# Patient Record
Sex: MALE | Race: WHITE
[De-identification: names, ages, dates, MRNs, and addresses within clinical notes are randomized per-mention and may not be internally consistent; named-entity substitution may affect disease eponyms.]

---

## 2019-09-16 ENCOUNTER — HOSPITAL ENCOUNTER (EMERGENCY)
Dept: HOSPITAL 63 - ER | Age: 49
Discharge: ANOTHER HOSPITAL | End: 2019-09-16
Payer: COMMERCIAL

## 2019-09-16 VITALS
SYSTOLIC BLOOD PRESSURE: 135 MMHG | SYSTOLIC BLOOD PRESSURE: 135 MMHG | SYSTOLIC BLOOD PRESSURE: 135 MMHG | DIASTOLIC BLOOD PRESSURE: 82 MMHG | DIASTOLIC BLOOD PRESSURE: 82 MMHG | DIASTOLIC BLOOD PRESSURE: 82 MMHG

## 2019-09-16 VITALS — WEIGHT: 232.15 LBS | BODY MASS INDEX: 33.23 KG/M2 | HEIGHT: 70 IN

## 2019-09-16 DIAGNOSIS — I63.00: Primary | ICD-10-CM

## 2019-09-16 DIAGNOSIS — R47.81: ICD-10-CM

## 2019-09-16 LAB
ALBUMIN SERPL-MCNC: 4.5 G/DL (ref 3.4–5)
ALBUMIN/GLOB SERPL: 1.6 {RATIO} (ref 1–1.7)
ALP SERPL-CCNC: 71 U/L (ref 46–116)
ALT SERPL-CCNC: 39 U/L (ref 16–63)
ANION GAP SERPL CALC-SCNC: 11 MMOL/L (ref 6–14)
AST SERPL-CCNC: 23 U/L (ref 15–37)
BASOPHILS # BLD AUTO: 0.1 X10^3/UL (ref 0–0.2)
BASOPHILS NFR BLD: 1 % (ref 0–3)
BILIRUB SERPL-MCNC: 0.3 MG/DL (ref 0.2–1)
BUN/CREAT SERPL: 18 (ref 6–20)
CA-I SERPL ISE-MCNC: 25 MG/DL (ref 8–26)
CALCIUM SERPL-MCNC: 9 MG/DL (ref 8.5–10.1)
CHLORIDE SERPL-SCNC: 103 MMOL/L (ref 98–107)
CO2 SERPL-SCNC: 25 MMOL/L (ref 21–32)
CREAT SERPL-MCNC: 1.4 MG/DL (ref 0.7–1.3)
EOSINOPHIL NFR BLD: 0.1 X10^3/UL (ref 0–0.7)
EOSINOPHIL NFR BLD: 1 % (ref 0–3)
ERYTHROCYTE [DISTWIDTH] IN BLOOD BY AUTOMATED COUNT: 14.1 % (ref 11.5–14.5)
ERYTHROCYTE [DISTWIDTH] IN BLOOD BY AUTOMATED COUNT: 14.5 % (ref 11.5–14.5)
GFR SERPLBLD BASED ON 1.73 SQ M-ARVRAT: 54.1 ML/MIN
GLOBULIN SER-MCNC: 2.8 G/DL (ref 2.2–3.8)
GLUCOSE SERPL-MCNC: 167 MG/DL (ref 70–99)
HCT VFR BLD CALC: 44.3 % (ref 39–53)
HCT VFR BLD CALC: 44.7 % (ref 39–53)
HGB BLD-MCNC: 15 G/DL (ref 13–17.5)
HGB BLD-MCNC: 15.2 G/DL (ref 13–17.5)
LYMPHOCYTES # BLD: 3.3 X10^3/UL (ref 1–4.8)
LYMPHOCYTES NFR BLD AUTO: 52 % (ref 24–48)
MCH RBC QN AUTO: 31 PG (ref 25–35)
MCH RBC QN AUTO: 31 PG (ref 25–35)
MCHC RBC AUTO-ENTMCNC: 34 G/DL (ref 31–37)
MCHC RBC AUTO-ENTMCNC: 34 G/DL (ref 31–37)
MCV RBC AUTO: 91 FL (ref 79–100)
MCV RBC AUTO: 91 FL (ref 79–100)
MONO #: 0.5 X10^3/UL (ref 0–1.1)
MONOCYTES NFR BLD: 9 % (ref 0–9)
NEUT #: 2.4 X10^3UL (ref 1.8–7.7)
NEUTROPHILS NFR BLD AUTO: 38 % (ref 31–73)
PLATELET # BLD AUTO: 235 X10^3/UL (ref 140–400)
PLATELET # BLD AUTO: 243 X10^3/UL (ref 140–400)
POTASSIUM SERPL-SCNC: 3.9 MMOL/L (ref 3.5–5.1)
PROT SERPL-MCNC: 7.3 G/DL (ref 6.4–8.2)
RBC # BLD AUTO: 4.86 X10^6/UL (ref 4.3–5.7)
RBC # BLD AUTO: 4.93 X10^6/UL (ref 4.3–5.7)
SODIUM SERPL-SCNC: 139 MMOL/L (ref 136–145)
WBC # BLD AUTO: 6.2 X10^3/UL (ref 4–11)
WBC # BLD AUTO: 6.3 X10^3/UL (ref 4–11)

## 2019-09-16 PROCEDURE — 37195 THROMBOLYTIC THERAPY STROKE: CPT

## 2019-09-16 PROCEDURE — 36415 COLL VENOUS BLD VENIPUNCTURE: CPT

## 2019-09-16 PROCEDURE — 70450 CT HEAD/BRAIN W/O DYE: CPT

## 2019-09-16 PROCEDURE — 85610 PROTHROMBIN TIME: CPT

## 2019-09-16 PROCEDURE — 93005 ELECTROCARDIOGRAM TRACING: CPT

## 2019-09-16 PROCEDURE — 70498 CT ANGIOGRAPHY NECK: CPT

## 2019-09-16 PROCEDURE — 71045 X-RAY EXAM CHEST 1 VIEW: CPT

## 2019-09-16 PROCEDURE — 85730 THROMBOPLASTIN TIME PARTIAL: CPT

## 2019-09-16 PROCEDURE — 80053 COMPREHEN METABOLIC PANEL: CPT

## 2019-09-16 PROCEDURE — 85027 COMPLETE CBC AUTOMATED: CPT

## 2019-09-16 PROCEDURE — 85025 COMPLETE CBC W/AUTO DIFF WBC: CPT

## 2019-09-16 PROCEDURE — 70496 CT ANGIOGRAPHY HEAD: CPT

## 2019-09-16 PROCEDURE — 84484 ASSAY OF TROPONIN QUANT: CPT

## 2019-09-16 PROCEDURE — 99291 CRITICAL CARE FIRST HOUR: CPT

## 2019-09-16 NOTE — EKG
[facility] [address], [place] [address]

Test Date:    2019               Test Time:    06:54:19

Pat Name:     RANI HOGAN             Department:   

Patient ID:   [id number]           Room:          

Gender:       M                        Technician:   BRENDAN

:          1970               Requested By: BENTIO HOPPER

Order Number: [id number]            Reading MD:     

                                 Measurements

Intervals                              Axis          

Rate:         83                       P:            38

RI:           166                      QRS:          23

QRSD:         90                       T:            27

QT:           372                                    

QTc:          438                                    

                           Interpretive Statements

SINUS RHYTHM

NORMAL ECG

RI6.01

No previous ECG available for comparison

## 2019-09-16 NOTE — RAD
CT CODE STROKE HEAD WO

 

History: Slurred speech

 

Comparison: November 7, 2013 MRI brain exam and July 16, 2010 CT head exam

 

Technique: Noncontrast CT imaging was performed of the head.  Exposure: 

One or more of the following individualized dose reduction techniques were

utilized for this examination:  1. Automated exposure control  2. 

Adjustment of the mA and/or kV according to patient size  3. Use of 

iterative reconstruction technique.

 

Findings: No acute intracranial hyperdense hemorrhage is identified. There

is no midline shift or intra-axial mass effect. Ventricular size is within

normal limits. There is now focus of low density of the posterior right 

cerebellum about 1.1 cm in size. There is questionable subtle focus of 

lower density of the left ez, difficult to evaluate given artifact in 

this region.

 

Impression:

1. There is no evidence of acute intracranial hemorrhage.

2. There is focus of lower density of the posterior right cerebellum new 

since the previous exams. Features are more suggestive of chronic infarct 

although could be due to late subacute infarct. There is a questionable 

subtle focus of lower density of the left ez which could be due to site 

of more recent ischemia although otherwise difficult to evaluate, better 

characterized by MRI if clinically needed.

 

Findings discussed with BENITO HOPPER at 9/16/2019 7:02 AM.

 

**********FOR INTERNAL CODING PURPOSES**********

 

 

RESULT CODE: (C)  

 

 

 

 

 

 

Electronically signed by: Peter Marks MD (9/16/2019 7:04 AM) [facility]

## 2019-09-16 NOTE — RAD
CTA head and neck

 

History: Slurred speech, right hand weakness, stroke

 

Technique: After bolus of intravenous contrast, volumetric CT data 

acquisition was acquired of the head and neck. Multiplanar reconstruction 

images to include MIP and 3-D reconstruction images are submitted.  

Exposure: One or more of the following individualized dose reduction 

techniques were utilized for this examination:  1. Automated exposure 

control  2. Adjustment of the mA and/or kV according to patient size  3. 

Use of iterative reconstruction technique.

 

Comparison: Head CT the same day

 

Any determination of stenosis is based on NASCET criteria.  

 

CTA head: 

Findings: There is mild motion. There is some venous contamination. Both 

vertebral arteries constitute the basilar artery, slightly dominant left 

vertebral artery. There is visualization of segments of bilateral PICAs, 

left AICA, bilateral superior cerebellar arteries. Right AICA is not 

well-visualized. There is patent left posterior communicating artery, 

hypoplastic left A1 segment is not well-visualized more distally. No 

significant right posterior communicating artery is visualized. Anterior 

communicating artery is not clearly identified. There is visualization of 

segments of the anterior, middle, posterior cerebral arteries bilaterally.

No focal filling defect is identified of the larger intracranial vessels. 

No significant intracranial aneurysm is identified.

 

There is small left maxillary sinus mucous retention cyst.

 

Impression:

1.  No focal filling defect is identified of the larger intracranial 

vessels. Right AICA is not well-visualized on this exam although may be 

small in caliber.

 

Neck CTA:

 

Findings: There is some motion degradation. Visualized distal ascending 

thoracic aorta is ectatic about 3.9 cm, not fully evaluated. There is a 

shared origin of the brachiocephalic and left common carotid arteries. No 

significant stenosis or dissection flap is identified of the cervical 

arterial vasculature. There is visualization of the cervical vertebral 

arteries bilaterally.

 

Impression:

1. Exam is somewhat degraded by motion. There is no convincing dissection 

flap or stenosis of the cervical arterial vasculature.

2. There is ectatic distal ascending thoracic aorta about 3.9 cm, thoracic

aorta not fully evaluated.

 

Findings discussed with BENITO HOPPER at 9/16/2019 7:46 AM.

 

**********FOR INTERNAL CODING PURPOSES**********

 

 

RESULT CODE: (C)  

 

 

 

 

 

 

 

 

Electronically signed by: Peter Marks MD (9/16/2019 7:46 AM) [facility]

## 2019-09-16 NOTE — RAD
CHEST AP ONLY

 

History: Stroke

 

Comparison: None.

 

Findings:

Single view of the chest is submitted. 

There is no dependent pleural fluid or pneumothorax. Heart size is within 

normal limits. There is mild perihilar opacity.

 

Impression: 

 

1.  No lobar infiltrate. There is mild perihilar opacity possible mild 

central vascular congestion.

 

Electronically signed by: Peter Marks MD (9/16/2019 7:38 AM) [facility]

## 2019-09-16 NOTE — PHYS DOC
Adult General


Chief Complaint


Chief Complaint:  NEURO SYMPTOMS/DEFICITS





HPI


HPI


49-year-old male presents via EMS for  Code Stroke. Patient's last known well 

was last night around 9:30 PM. He woke up around 4:30 AM and took a shower and 

got ready for work. He kissed his wife girma around 5 AM. At that time he was 

talking without difficulty. He went downstairs and within several minutes came 

back up stairs and turned on the bedroom light. The patient's spouse asked him 

what was going on and he was unable to speak to her. She tells me that he looked

pale and concerned. He was making a repetitive cervical motion with his hand. 

She asked him if he wanted her to call 911 and he nodded yes. The patient has 

been unable to speak since that time. He was unable to answer questions for EMS.

He is following directions. He was found to have right lower facial asymmetry 

and decreased hand strength on the right. On arrival to the ED, the patient was 

still unable to speak. He was alert and following commands.





Review of Systems


Review of Systems





Constitutional: Denies fever or chills []


Eyes: Denies change in visual acuity, redness, or eye pain []


HENT: Denies nasal congestion or sore throat []


Respiratory: Denies cough or shortness of breath []


Cardiovascular: No additional information not addressed in HPI []


GI: Denies abdominal pain, nausea, vomiting, bloody stools or diarrhea []


: Denies dysuria or hematuria []


Musculoskeletal: Denies back pain or joint pain []


Integument: Denies rash or skin lesions []


Neurologic: Denies headache, focal weakness or sensory changes []


Endocrine: Denies polyuria or polydipsia []





All other systems were reviewed and found to be within normal limits, except as 

documented in this note.





Physical Exam


Physical Exam





Constitutional: Well developed, well nourished, no acute distress, non-toxic 

appearance. []


HENT: Normocephalic, atraumatic, bilateral external ears normal, oropharynx 

moist, no oral exudates, nose normal. []


Eyes: PERRLA, EOMI, conjunctiva normal, no discharge. [] 


Neck: Normal range of motion, no tenderness, supple, no stridor. [] 


Cardiovascular:Heart rate regular rhythm, no murmur []


Lungs & Thorax:  Bilateral breath sounds clear to auscultation []


Abdomen: Bowel sounds normal, soft, no tenderness, no masses, no pulsatile 

masses. [] 


Skin: Warm, dry, no erythema, no rash. [] 


Back: No tenderness, no CVA tenderness. [] 


Extremities: No tenderness, no cyanosis, no clubbing, ROM intact, no edema. [] 


Neurologic: Alert and oriented X 3, normal motor function, normal sensory 

function, no focal deficits noted. []


Psychologic: Affect normal, judgement normal, mood normal. []





EKG


EKG


Sinus rhythm, rate 83, normal axis, no ST elevations or depressions.[]





Radiology/Procedures


Radiology/Procedures


[]


Impressions:


CT CODE STROKE HEAD WO


 


History: Slurred speech


 


Comparison: November 7, 2013 MRI brain exam and July 16, 2010 CT head exam


 


Technique: Noncontrast CT imaging was performed of the head.  Exposure: 


One or more of the following individualized dose reduction techniques were


utilized for this examination:  1. Automated exposure control  2. 


Adjustment of the mA and/or kV according to patient size  3. Use of 


iterative reconstruction technique.


 


Findings: No acute intracranial hyperdense hemorrhage is identified. There


is no midline shift or intra-axial mass effect. Ventricular size is within


normal limits. There is now focus of low density of the posterior right 


cerebellum about 1.1 cm in size. There is questionable subtle focus of 


lower density of the left ez, difficult to evaluate given artifact in 


this region.


 


Impression:


1. There is no evidence of acute intracranial hemorrhage.


2. There is focus of lower density of the posterior right cerebellum new 


since the previous exams. Features are more suggestive of chronic infarct 


although could be due to late subacute infarct. There is a questionable 


subtle focus of lower density of the left ez which could be due to site 


of more recent ischemia although otherwise difficult to evaluate, better 


characterized by MRI if clinically needed.


 


Findings discussed with BENITO HOPPER at 9/16/2019 7:02 AM.


 


**********FOR INTERNAL CODING PURPOSES**********


 


 


RESULT CODE: (C)  


 


 


 


 


 


 


Electronically signed by: Cecilia Haynes MD (9/16/2019 7:04 AM) [facility]














DICTATED AND SIGNED BY:     CECILIA HAYNES MD


DATE:     09/16/19 0704





CC: BENITO HOPPER DO; EBER WELDON MD ~











CHEST AP ONLY


 


History: Stroke


 


Comparison: None.


 


Findings:


Single view of the chest is submitted. 


There is no dependent pleural fluid or pneumothorax. Heart size is within 


normal limits. There is mild perihilar opacity.


 


Impression: 


 


1.  No lobar infiltrate. There is mild perihilar opacity possible mild 


central vascular congestion.


 


Electronically signed by: Cecilia Haynes MD (9/16/2019 7:38 AM) [facility]














DICTATED AND SIGNED BY:     CECILIA HAYNES MD


DATE:     09/16/19 0738





CC: BENITO HOPPER DO; EBER WELDON MD ~











CTA head and neck


 


History: Slurred speech, right hand weakness, stroke


 


Technique: After bolus of intravenous contrast, volumetric CT data 


acquisition was acquired of the head and neck. Multiplanar reconstruction 


images to include MIP and 3-D reconstruction images are submitted.  


Exposure: One or more of the following individualized dose reduction 


techniques were utilized for this examination:  1. Automated exposure 


control  2. Adjustment of the mA and/or kV according to patient size  3. 


Use of iterative reconstruction technique.


 


Comparison: Head CT the same day


 


Any determination of stenosis is based on NASCET criteria.  


 


CTA head: 


Findings: There is mild motion. There is some venous contamination. Both 


vertebral arteries constitute the basilar artery, slightly dominant left 


vertebral artery. There is visualization of segments of bilateral PICAs, 


left AICA, bilateral superior cerebellar arteries. Right AICA is not 


well-visualized. There is patent left posterior communicating artery, 


hypoplastic left A1 segment is not well-visualized more distally. No 


significant right posterior communicating artery is visualized. Anterior 


communicating artery is not clearly identified. There is visualization of 


segments of the anterior, middle, posterior cerebral arteries bilaterally.


No focal filling defect is identified of the larger intracranial vessels. 


No significant intracranial aneurysm is identified.


 


There is small left maxillary sinus mucous retention cyst.


 


Impression:


1.  No focal filling defect is identified of the larger intracranial 


vessels. Right AICA is not well-visualized on this exam although may be 


small in caliber.


 


Neck CTA:


 


Findings: There is some motion degradation. Visualized distal ascending 


thoracic aorta is ectatic about 3.9 cm, not fully evaluated. There is a 


shared origin of the brachiocephalic and left common carotid arteries. No 


significant stenosis or dissection flap is identified of the cervical 


arterial vasculature. There is visualization of the cervical vertebral 


arteries bilaterally.


 


Impression:


1. Exam is somewhat degraded by motion. There is no convincing dissection 


flap or stenosis of the cervical arterial vasculature.


2. There is ectatic distal ascending thoracic aorta about 3.9 cm, thoracic


aorta not fully evaluated.


 


Findings discussed with BENITO HOPPER at 9/16/2019 7:46 AM.


 


**********FOR INTERNAL CODING PURPOSES**********


 


 


RESULT CODE: (C)  


 


 


 


 


 


 


 


 


Electronically signed by: Cecilia Hyanes MD (9/16/2019 7:46 AM) [facility]














DICTATED AND SIGNED BY:     CECILIA HAYNES MD


DATE:     09/16/19 0746





CC: BENITO OHPPER DO; EBER WELDON MD ~





Course & Med Decision Making


Course & Med Decision Making


Pertinent Labs and Imaging studies reviewed. (See chart for details)


Further discussion with the patient's spouse reveals that he appeared to be 

normal up to 5 AM rather than last night. She states that he was able to get 

ready to go to work completely on his own. He was speaking at that time. I will 

consider his last known well to be 5 AM. This places the patient within the 

window for TPA. His head CT is negative. His initial NIH scale is 8 with 

significant aphasia. The patient has no history of hemorrhagic stroke. He has no

 history of bleeding disorders. No recent surgery. He is not on any blood 

thinners or antiplatelets. I believe the patient has a good candidate for TPA. 

At 7:20 AM the patient was ordered to be given alteplase.


The patient's labs are unremarkable except for slightly elevated creatinine of 

1.4. I have no previous for comparison. His troponin is negative. His EKG is 

unremarkable. The patient's blood pressure has remained within normal limits.


Prior to the alteplase, the patient was able to speak. He can answer simple 

questions with yes, no, and couple of other short words.  He appeared to be able

 to understand everything he was asked. He was just unable to get the words out.

 This would improve his NIH scale to 6 as he does not appear to have dysarthria.

 After the alteplase, the patient did not make further significant improvement. 

 I spoke with Dr. Acosta and he has accepted the patient for transfer. And 

admission to the ICU at [facility]. The patient and his family 

are in agreement with this plan. He will go by ambulance.





43 minutes of critical care time was spent on this patient exclusive of other 

billable procedures.


[]





Dragon Disclaimer


Dragon Disclaimer


This electronic medical record was generated, in whole or in part, using a voice

 recognition dictation system.


NIH Stroke Scale:  








NIH Stroke Scale Response (Comments) Value


 


Level of Consciousness:                 0 Alert/Responsive 0


 


LOC Questions:                          2 Answers neither correct 2


 


LOC Commands:                           0 Performs both tasks 0


 


Best Gaze:                              0 Normal 0


 


Visual:                                 0 No visual loss 0


 


Facial Palsy:                           1 Minor paralysis 1


 


Motor - Left Arm                        0 No drift 0


 


Motor - Right Arm                       0 No drift 0


 


Motor - Left Leg                        0 No drift 0


 


Motor: Right Leg                        0 No drift 0


 


Limb Ataxia:                            1 One limb 1


 


Sensory:                                0 No loss 0


 


Best Language:                          2 Severe aphasia 2


 


Dysathria:                              2 Severe 2


 


Extinction and Inattention:             0 Normal 0


 


Total  8











Departure


Departure:


Impression:  


   Primary Impression:  


   Stroke


Disposition:  02 XFER SHT-TRM HOSP


Condition:  STABLE


Referrals:  


EBER WELDON MD (PCP)





Problem Qualifiers








   Primary Impression:  


   Stroke


   CVA mechanism:  thrombosis  Precerebral and cerebral artery:  unspecified 

   precerebral artery  Qualified Codes:  I63.00 - Cerebral infarction due to 

   thrombosis of unspecified precerebral artery








BENITO HOPPER DO                 Sep 16, 2019 06:30

## 2019-11-04 ENCOUNTER — HOSPITAL ENCOUNTER (OUTPATIENT)
Dept: HOSPITAL 63 - LAB | Age: 49
Discharge: HOME | End: 2019-11-04
Payer: COMMERCIAL

## 2019-11-04 DIAGNOSIS — I63.89: Primary | ICD-10-CM

## 2019-11-04 LAB
ALBUMIN SERPL-MCNC: 4.4 G/DL (ref 3.4–5)
ALP SERPL-CCNC: 54 U/L (ref 46–116)
ALT SERPL-CCNC: 49 U/L (ref 16–63)
AST SERPL-CCNC: 31 U/L (ref 15–37)
BILIRUB DIRECT SERPL-MCNC: 0.1 MG/DL (ref 0–0.2)
BILIRUB SERPL-MCNC: 0.5 MG/DL (ref 0.2–1)
PROT SERPL-MCNC: 7.5 G/DL (ref 6.4–8.2)

## 2019-11-04 PROCEDURE — 80076 HEPATIC FUNCTION PANEL: CPT

## 2019-11-04 PROCEDURE — 36415 COLL VENOUS BLD VENIPUNCTURE: CPT

## 2019-11-04 PROCEDURE — 82550 ASSAY OF CK (CPK): CPT

## 2019-11-26 ENCOUNTER — HOSPITAL ENCOUNTER (OUTPATIENT)
Dept: HOSPITAL 63 - LAB | Age: 49
Discharge: HOME | End: 2019-11-26
Payer: COMMERCIAL

## 2019-11-26 DIAGNOSIS — R74.8: Primary | ICD-10-CM

## 2019-11-26 PROCEDURE — 82550 ASSAY OF CK (CPK): CPT

## 2019-11-26 PROCEDURE — 36415 COLL VENOUS BLD VENIPUNCTURE: CPT

## 2019-12-04 ENCOUNTER — HOSPITAL ENCOUNTER (EMERGENCY)
Dept: HOSPITAL 63 - ER | Age: 49
Discharge: HOME | End: 2019-12-04
Payer: COMMERCIAL

## 2019-12-04 VITALS — BODY MASS INDEX: 32.64 KG/M2 | HEIGHT: 70 IN | WEIGHT: 228 LBS

## 2019-12-04 VITALS — SYSTOLIC BLOOD PRESSURE: 146 MMHG | DIASTOLIC BLOOD PRESSURE: 111 MMHG
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
## 2019-12-04 DIAGNOSIS — E78.00: ICD-10-CM

## 2019-12-04 DIAGNOSIS — K13.0: ICD-10-CM

## 2019-12-04 DIAGNOSIS — T78.40XA: Primary | ICD-10-CM

## 2019-12-04 DIAGNOSIS — I10: ICD-10-CM

## 2019-12-04 PROCEDURE — 99284 EMERGENCY DEPT VISIT MOD MDM: CPT

## 2019-12-04 NOTE — PHYS DOC
Past History


Past Medical History:  High Cholesterol, Hypertension, Other


Additional Past Medical Histor:  vertigo


Past Surgical History:  Other


Additional Past Surgical Histo:  hardware in rt ankle


Alcohol Use:  None


Drug Use:  None





Adult General


Chief Complaint


Chief Complaint:  ALLERGIC REACTION





HPI


HPI


Patient is a pleasant 49-year-old male who presents to the emergency department 

for evaluation. He states after eating lunch, at which time he ate some chicken 

and sweet tea, food he has eaten before, he states that he developed swelling of

his left lower lip, as well as some hives and itchiness on his anterior neck, as

well as some itchiness to his eyes when he was driving here. He denies any 

trouble breathing, voice changes, or trouble swallowing. He has a history of a 

PFO and a small stroke, and had a procedure to close his PFO about 3 weeks ago 

and is currently on Plavix for 3 months post procedure. He is also on lis

inopril. He denies any other medication changes or new medications.





Review of Systems


Review of Systems





Constitutional: Denies fever or chills []


Eyes: Denies change in visual acuity, redness, or eye pain []


HENT: Denies nasal congestion or sore throat []


Respiratory: Denies cough or shortness of breath []


Cardiovascular: The patient denies any shortness of breath, chest pain, 

palpitations, or orthopnea[]


GI: Denies abdominal pain, nausea, vomiting, bloody stools or diarrhea []


: Denies dysuria or hematuria []


Musculoskeletal: Denies back pain or joint pain []


Integument: Denies rash or skin lesions, other than as noted in the history of 

present illness []


Neurologic: Denies headache, focal weakness or sensory changes []


Endocrine: Denies polyuria or polydipsia []





All other systems were reviewed and found to be within normal limits, except as 

documented in this note.





Allergies


Allergies





Allergies








Coded Allergies Type Severity Reaction Last Updated Verified


 


  No Known Drug Allergies    9/16/19 No











Physical Exam


Physical Exam


PHYSICAL EXAM:





CONSTITUTIONAL: Well developed, well nourished


HEAD: normocephalic, atraumatic


EENT: PERRL, EOMI. Conjunctivae normal color, sclerae non-icteric; moist mucous 

membranes. The oropharynx is unremarkable, there is no edema. The left lower lip

 is edematous, but nontender, with no erythema. There is no warmth to this area.

 There is questionable edema noted to the infraorbital area bilaterally. No 

other abnormalities noted. Voice is normal. There is no stridor.


NECK: Supple, non-tender; no meningismus.


LUNGS: Lungs CTA, breathing even and unlabored. Normal air movement.


HEART: Regular rate and rhythm, no murmur


CHEST: No deformity; non-tender


ABDOMEN: The abdomen is soft, and non-tender, no masses or bruits.


EXTREM: Normal ROM; no deformity, no calf tenderness. Normal pulses palpable in 

all extremities. There is no pedal edema.


SKIN: There are urticarial lesions on the anterior neck, no other rash; no 

diaphoresis


NEURO: Alert; normal speech and cognition; CN's grossly intact; strength grossly

 intact without focal deficit.


BACK: No CVA TTP.





Current Patient Data


Vital Signs





                                   Vital Signs








  Date Time  Temp Pulse Resp B/P (MAP) Pulse Ox O2 Delivery O2 Flow Rate FiO2


 


12/4/19 17:02 98.2 88 16  96 Room Air  











EKG


EKG


[]





Radiology/Procedures


Radiology/Procedures


[]





Course & Med Decision Making


Course & Med Decision Making


Patient's condition remains stable. I discussed diagnostic considerations with 

the patient. Allergic reaction seems more likely than angioedema, despite the 

left lower lip involvement, given the urticarial lesions on the neck. However as

 a precaution I will change the patient's lisinopril, which I instructed him to 

stop, the Norvasc for blood pressure management and discuss importance of close 

follow-up with his primary care provider. Plavix might cause allergic reactions 

as well, and I discussed the importance of further outpatient evaluation if 

symptoms persist. Return precautions were reviewed in detail.





Dragon Disclaimer


Dragon Disclaimer


This electronic medical record was generated, in whole or in part, using a voice

 recognition dictation system.





Departure


Departure:


Impression:  


   Primary Impression:  


   Allergic reaction


Disposition:  01 HOME, SELF-CARE


Condition:  STABLE


Referrals:  


DENIS ROMERO (PCP)


Patient Instructions:  Allergies, Generic, Angioedema





Additional Instructions:  


Take Benadryl 25-50 mg every 6 hours for the next 3 days. Use caution as this 

may cause sedation.


Additionally, take Pepcid AC 10 mg twice daily for the next 3 days. This 

medicine is available over-the-counter.


Use the prescribed steroids as instructed.


Return to medical care for any new, or worsening symptoms, the development of 

shortness of breath, new rash, dizziness lightheadedness, fevers, or any other 

new, or concerning symptoms.





Stop taking lisinopril. Begin taking the new medications instead.


Scripts


Amlodipine Besylate (NORVASC) 5 Mg Tablet


1 TAB PO DAILY for -, #30 TAB 2 Refills


   Prov: FRANCINE CARABALLO MD         12/4/19 


Prednisone (PREDNISONE) 20 Mg Tablet


40 MG PO DAILY for - for 5 Days, #10 TAB


   Prov: FRANCINE CARABALLO MD         12/4/19











FRANCINE CARABALLO MD            Dec 4, 2019 17:21

## 2019-12-06 ENCOUNTER — HOSPITAL ENCOUNTER (EMERGENCY)
Dept: HOSPITAL 63 - ER | Age: 49
Discharge: HOME | End: 2019-12-06
Payer: COMMERCIAL

## 2019-12-06 VITALS — HEIGHT: 70 IN | BODY MASS INDEX: 32.19 KG/M2 | WEIGHT: 224.87 LBS

## 2019-12-06 VITALS — DIASTOLIC BLOOD PRESSURE: 75 MMHG | SYSTOLIC BLOOD PRESSURE: 129 MMHG

## 2019-12-06 DIAGNOSIS — T78.40XA: Primary | ICD-10-CM

## 2019-12-06 DIAGNOSIS — Z86.73: ICD-10-CM

## 2019-12-06 DIAGNOSIS — Z88.8: ICD-10-CM
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
## 2019-12-06 DIAGNOSIS — E78.00: ICD-10-CM

## 2019-12-06 DIAGNOSIS — I10: ICD-10-CM

## 2019-12-06 LAB
ALBUMIN SERPL-MCNC: 4.4 G/DL (ref 3.4–5)
ALBUMIN/GLOB SERPL: 1.3 {RATIO} (ref 1–1.7)
ALP SERPL-CCNC: 70 U/L (ref 46–116)
ALT SERPL-CCNC: 44 U/L (ref 16–63)
ANION GAP SERPL CALC-SCNC: 14 MMOL/L (ref 6–14)
AST SERPL-CCNC: 17 U/L (ref 15–37)
BASOPHILS # BLD AUTO: 0.1 X10^3/UL (ref 0–0.2)
BASOPHILS NFR BLD: 1 % (ref 0–3)
BILIRUB SERPL-MCNC: 0.6 MG/DL (ref 0.2–1)
BUN/CREAT SERPL: 15 (ref 6–20)
CA-I SERPL ISE-MCNC: 20 MG/DL (ref 8–26)
CALCIUM SERPL-MCNC: 8.8 MG/DL (ref 8.5–10.1)
CHLORIDE SERPL-SCNC: 100 MMOL/L (ref 98–107)
CO2 SERPL-SCNC: 23 MMOL/L (ref 21–32)
CREAT SERPL-MCNC: 1.3 MG/DL (ref 0.7–1.3)
EOSINOPHIL NFR BLD: 0 % (ref 0–3)
EOSINOPHIL NFR BLD: 0 X10^3/UL (ref 0–0.7)
ERYTHROCYTE [DISTWIDTH] IN BLOOD BY AUTOMATED COUNT: 13.9 % (ref 11.5–14.5)
GFR SERPLBLD BASED ON 1.73 SQ M-ARVRAT: 58.9 ML/MIN
GLOBULIN SER-MCNC: 3.5 G/DL (ref 2.2–3.8)
GLUCOSE SERPL-MCNC: 186 MG/DL (ref 70–99)
HCT VFR BLD CALC: 50.9 % (ref 39–53)
HGB BLD-MCNC: 17.2 G/DL (ref 13–17.5)
LYMPHOCYTES # BLD: 2 X10^3/UL (ref 1–4.8)
LYMPHOCYTES NFR BLD AUTO: 17 % (ref 24–48)
MCH RBC QN AUTO: 30 PG (ref 25–35)
MCHC RBC AUTO-ENTMCNC: 34 G/DL (ref 31–37)
MCV RBC AUTO: 90 FL (ref 79–100)
MONO #: 0.8 X10^3/UL (ref 0–1.1)
MONOCYTES NFR BLD: 7 % (ref 0–9)
NEUT #: 8.6 X10^3UL (ref 1.8–7.7)
NEUTROPHILS NFR BLD AUTO: 75 % (ref 31–73)
PLATELET # BLD AUTO: 331 X10^3/UL (ref 140–400)
POTASSIUM SERPL-SCNC: 3.8 MMOL/L (ref 3.5–5.1)
PROT SERPL-MCNC: 7.9 G/DL (ref 6.4–8.2)
RBC # BLD AUTO: 5.67 X10^6/UL (ref 4.3–5.7)
SODIUM SERPL-SCNC: 137 MMOL/L (ref 136–145)
WBC # BLD AUTO: 11.5 X10^3/UL (ref 4–11)

## 2019-12-06 PROCEDURE — 96361 HYDRATE IV INFUSION ADD-ON: CPT

## 2019-12-06 PROCEDURE — 85025 COMPLETE CBC W/AUTO DIFF WBC: CPT

## 2019-12-06 PROCEDURE — 96374 THER/PROPH/DIAG INJ IV PUSH: CPT

## 2019-12-06 PROCEDURE — 82550 ASSAY OF CK (CPK): CPT

## 2019-12-06 PROCEDURE — 80053 COMPREHEN METABOLIC PANEL: CPT

## 2019-12-06 PROCEDURE — 36415 COLL VENOUS BLD VENIPUNCTURE: CPT

## 2019-12-06 PROCEDURE — 96375 TX/PRO/DX INJ NEW DRUG ADDON: CPT

## 2019-12-06 PROCEDURE — 99284 EMERGENCY DEPT VISIT MOD MDM: CPT

## 2019-12-06 NOTE — PHYS DOC
Past History


Past Medical History:  High Cholesterol, Hypertension, Stroke, Other


Additional Past Medical Histor:  vertigo


Past Surgical History:  Other


Additional Past Surgical Histo:  hardware in rt ankle


Alcohol Use:  None


Drug Use:  None





Adult General


Chief Complaint


Chief Complaint:  ALLERGIC REACTION





HPI


HPI





Patient is a 48 yo m with allergic reaction


here two days ago


on plavix x one month (had pfo closure was supposed to be on it for three 

months)


had lip swelling got beandryl and steroids yesterday morning was improving then 

last night got worse now bilateral lip swelling, periorbital swelling. 


stoped the lisinopril but still on plavix


called his cardiologist last night asked him to stop it for now


no sob





Review of Systems


Review of Systems





Constitutional: Denies fever or chills []


Eyes: Denies change in visual acuity, redness, or eye pain []


HENT: Denies nasal congestion or sore throat []


Respiratory: Denies cough or shortness of breath []


Cardiovascular: No additional information not addressed in HPI []





Neurologic: Denies headache, focal weakness or sensory changes []


Endocrine: Denies polyuria or polydipsia []





All other systems were reviewed and found to be within normal limits, except as 

documented in this note.





Current Medications


Current Medications





Current Medications








 Medications


  (Trade)  Dose


 Ordered  Sig/Radha  Start Time


 Stop Time Status Last Admin


Dose Admin


 


 Diphenhydramine


 HCl


  (Benadryl)  50 mg  1X  ONCE  12/6/19 06:15


 12/6/19 06:16  12/6/19 06:05


50 MG


 


 Famotidine


  (Pepcid Vial)  20 mg  1X  ONCE  12/6/19 06:15


 12/6/19 06:16  12/6/19 06:05


20 MG


 


 Sodium Chloride  1,000 ml @ 


 1,000 mls/hr  1X  ONCE  12/6/19 06:30


 12/6/19 07:29   














Allergies


Allergies





Allergies








Coded Allergies Type Severity Reaction Last Updated Verified


 


  lisinopril Allergy Severe  12/6/19 Yes











Physical Exam


Physical Exam





Constitutional: Well developed, well nourished, no acute distress, non-toxic 

appearance. []


HENT: Normocephalic, atraumatic, bilateral external ears normal, mild swelling 

upper lip


no uvula present


no intraoral swelling


Eyes: periorbital swelling noted[] 


Neck: Normal range of motion, no tenderness, supple, no stridor. [] 


Cardiovascular:mild tachycardia no murmur rubs gallops


Lungs & Thorax:  Bilateral breath sounds clear to auscultation []


Abdomen: Bowel sounds normal, soft, no tenderness, no masses, no pulsatile 

masses. [] 


Skin: urticarial rash on hands groin and axilla and trunk


Back: No tenderness, no CVA tenderness. [] 


Extremities: edema noted hands and feet.


Neurologic: Alert and oriented X 3, normal motor function, normal sensory 

function, no focal deficits noted. []


Psychologic: Affect normal, judgement normal, mood normal. []





Current Patient Data


Vital Signs





                                   Vital Signs








  Date Time  Temp Pulse Resp B/P (MAP) Pulse Ox O2 Delivery O2 Flow Rate FiO2


 


12/6/19 05:41 97.6 129 18  95 Room Air  











EKG


EKG


[]





Radiology/Procedures


Radiology/Procedures


[]





Course & Med Decision Making


Course & Med Decision Making


* Pertinent Labs and Imaging studies reviewed. (See chart for details)





[]Patient remained stable in the emergency room after treatment above. Heart 

rate did come down he said he felt a little better airway remained patent. I 

recommended he discontinue the Plavix speak with his cardiologist today about an

 ongoing treatment plan





I did note that the CK improved the creatinine is normal blood pressure is 

normal I think he is safe for outpatient management continue medications 

prescribed by previous emergency physician. For now do not take lisinopril or 

Plavix follow-up with cardiologist patient is agreeable to the plan





Dragon Disclaimer


Dragon Disclaimer


This electronic medical record was generated, in whole or in part, using a voice

 recognition dictation system.





Departure


Departure:


Impression:  


   Primary Impression:  


   Allergic reaction


Disposition:  01 HOME, SELF-CARE


Condition:  STABLE


Referrals:  


DENIS ROMERO (PCP)











CODY VALENZUELA MD             Dec 6, 2019 06:24

## 2019-12-08 ENCOUNTER — HOSPITAL ENCOUNTER (EMERGENCY)
Dept: HOSPITAL 63 - ER | Age: 49
Discharge: HOME | End: 2019-12-08
Payer: COMMERCIAL

## 2019-12-08 VITALS — WEIGHT: 224.87 LBS | HEIGHT: 70 IN | BODY MASS INDEX: 32.19 KG/M2

## 2019-12-08 VITALS
DIASTOLIC BLOOD PRESSURE: 74 MMHG | DIASTOLIC BLOOD PRESSURE: 74 MMHG | SYSTOLIC BLOOD PRESSURE: 122 MMHG | DIASTOLIC BLOOD PRESSURE: 74 MMHG | SYSTOLIC BLOOD PRESSURE: 122 MMHG | SYSTOLIC BLOOD PRESSURE: 122 MMHG

## 2019-12-08 DIAGNOSIS — I10: ICD-10-CM

## 2019-12-08 DIAGNOSIS — E78.00: ICD-10-CM

## 2019-12-08 DIAGNOSIS — Z88.8: ICD-10-CM

## 2019-12-08 DIAGNOSIS — T78.40XD: Primary | ICD-10-CM

## 2019-12-08 DIAGNOSIS — Z86.73: ICD-10-CM
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
## 2019-12-08 PROCEDURE — 99284 EMERGENCY DEPT VISIT MOD MDM: CPT

## 2019-12-08 NOTE — PHYS DOC
Past History


Past Medical History:  High Cholesterol, Hypertension, Stroke, Other


Additional Past Medical Histor:  vertigo


Past Surgical History:  Other


Additional Past Surgical Histo:  hardware in rt ankle


Alcohol Use:  None


Drug Use:  None





Adult General


Chief Complaint


Chief Complaint:  ALLERGIC REACTION





HPI


HPI


 Patient is a 49-year-old male, familiar to me from a prior ER visit, who 

presents to the emergency department for evaluation of itching on the back of 

his neck and scalp, along with his upper neck. He is familiar to me from an ER 

visit last week, when I saw him for an allergic reaction which was thought to be

due either to his Plavix or lisinopril, his lisinopril was discontinued, but the

patient returned the following day with persistent itching, and his Plavix was 

thus discontinued and changed to Brillinta. However, the patient states that his

symptoms seemed to improve for a bit and then he will get recurrence of his 

itchiness, and some hives. He states he is taking the steroids, and 

antihistamines as recommended, and has been using some steroid cream as well 

without improvement. He DENIES any occurrence of swelling in his face or lips, 

or any shortness of breath at this time. He denies any dizziness or 

lightheadedness. There are no alleviating or exacerbating factors to his 

symptoms otherwise





Review of Systems


Review of Systems





Constitutional: Denies fever or chills []


Eyes: Denies change in visual acuity, redness, or eye pain []


HENT: Denies nasal congestion or sore throat []


Respiratory: Denies cough or shortness of breath []


Cardiovascular: The patient denies any shortness of breath, chest pain, 

palpitations, or orthopnea []


GI: Denies abdominal pain, nausea, vomiting, bloody stools or diarrhea []


: Denies dysuria or hematuria []


Musculoskeletal: Denies back pain or joint pain []


Integument: Denies rash or skin lesions , other than as noted in the history of 

present illness[]


Neurologic: Denies headache, focal weakness or sensory changes []


Endocrine: Denies polyuria or polydipsia []





All other systems were reviewed and found to be within normal limits, except as 

documented in this note.





Current Medications


Current Medications





Current Medications








 Medications


  (Trade)  Dose


 Ordered  Sig/Radha  Start Time


 Stop Time Status Last Admin


Dose Admin


 


 Diphenhydramine


 HCl


  (Benadryl)  50 mg  STK-MED ONCE  12/8/19 13:49


 12/8/19 13:50 DC  





 


 Famotidine


  (Pepcid Vial)  20 mg  STK-MED ONCE  12/8/19 13:50


 12/8/19 13:50 DC  





 


 Methylprednisolone


 Sodium Succinate


  (SOLU-Medrol


 125MG VIAL)  125 mg  STK-MED ONCE  12/8/19 13:50


 12/8/19 13:50 DC  














Allergies


Allergies





Allergies








Coded Allergies Type Severity Reaction Last Updated Verified


 


  lisinopril Allergy Severe  12/6/19 Yes











Physical Exam


Physical Exam


PHYSICAL EXAM:





CONSTITUTIONAL: Well developed, well nourished


HEAD: normocephalic, atraumatic


EENT: PERRL, EOMI. Conjunctivae normal color, sclerae non-icteric; moist mucous 

membranes.


NECK: Supple, non-tender; no meningismus. There is no stridor. Voice is normal.


LUNGS: Lungs CTA, breathing even and unlabored. Normal air movement. There are 

no wheezes.


HEART: Regular rate and rhythm, no murmur


CHEST: No deformity; non-tender


ABDOMEN: The abdomen is soft, and non-tender, no masses or bruits.


EXTREM: Normal ROM; no deformity, no calf tenderness. Normal pulses palpable in 

all extremities. There is no pedal edema.


SKIN: There are a few scattered urticarial lesions on the posterior neck, and 

upper chest, no other rash; no diaphoresis


NEURO: Alert; normal speech and cognition; CN's grossly intact; strength grossly

 intact without focal deficit.


BACK: No CVA TTP.





EKG


EKG


[]





Radiology/Procedures


Radiology/Procedures


[]





Course & Med Decision Making


Course & Med Decision Making


Patient was observed in the emergency department and remains a stable. I 

discussed expectant  management, and continued compliance with the recommended 

medications, the need for close PCP follow-up and return precautions. Patient 

has PCP appointment scheduled for tomorrow. He has one more day left on his 

steroids, which is tomorrow.





Dragon Disclaimer


Dragon Disclaimer


This electronic medical record was generated, in whole or in part, using a voice

 recognition dictation system.





Departure


Departure:


Impression:  


   Primary Impression:  


   Allergic reaction


Disposition:  01 HOME, SELF-CARE


Condition:  STABLE


Referrals:  


DENIS ROMERO (PCP)


Patient Instructions:  Allergies, Generic











FRANCINE CARABALLO MD            Dec 8, 2019 14:22

## 2020-01-06 ENCOUNTER — HOSPITAL ENCOUNTER (OUTPATIENT)
Dept: HOSPITAL 63 - LAB | Age: 50
Discharge: HOME | End: 2020-01-06
Payer: COMMERCIAL

## 2020-01-06 DIAGNOSIS — R74.8: Primary | ICD-10-CM

## 2020-01-06 PROCEDURE — 36415 COLL VENOUS BLD VENIPUNCTURE: CPT

## 2020-01-06 PROCEDURE — 82550 ASSAY OF CK (CPK): CPT

## 2021-12-28 ENCOUNTER — HOSPITAL ENCOUNTER (OUTPATIENT)
Dept: HOSPITAL 63 - RAD | Age: 51
End: 2021-12-28
Attending: PHYSICIAN ASSISTANT
Payer: COMMERCIAL

## 2021-12-28 DIAGNOSIS — R10.33: Primary | ICD-10-CM

## 2021-12-28 PROCEDURE — 74019 RADEX ABDOMEN 2 VIEWS: CPT

## 2021-12-28 NOTE — RAD
EXAM: Abdomen, 2 views.



HISTORY: Pain.



COMPARISON: None.



FINDINGS: 2 views of the abdomen are obtained. There is a small amount of gas within the small and la
rge bowel and the stomach. There is no evidence of bowel obstruction. There is no free air.



IMPRESSION: Nonobstructive bowel gas pattern.



Electronically signed by: Sherri Bee MD (12/28/2021 5:40 PM) [id number]